# Patient Record
Sex: MALE | Race: WHITE | NOT HISPANIC OR LATINO
[De-identification: names, ages, dates, MRNs, and addresses within clinical notes are randomized per-mention and may not be internally consistent; named-entity substitution may affect disease eponyms.]

---

## 2020-07-27 ENCOUNTER — TRANSCRIPTION ENCOUNTER (OUTPATIENT)
Age: 50
End: 2020-07-27

## 2020-12-12 ENCOUNTER — TRANSCRIPTION ENCOUNTER (OUTPATIENT)
Age: 50
End: 2020-12-12

## 2020-12-18 ENCOUNTER — TRANSCRIPTION ENCOUNTER (OUTPATIENT)
Age: 50
End: 2020-12-18

## 2021-04-01 ENCOUNTER — TRANSCRIPTION ENCOUNTER (OUTPATIENT)
Age: 51
End: 2021-04-01

## 2021-04-05 ENCOUNTER — TRANSCRIPTION ENCOUNTER (OUTPATIENT)
Age: 51
End: 2021-04-05

## 2021-06-12 ENCOUNTER — EMERGENCY (EMERGENCY)
Facility: HOSPITAL | Age: 51
LOS: 1 days | Discharge: HOME | End: 2021-06-12
Attending: EMERGENCY MEDICINE | Admitting: EMERGENCY MEDICINE
Payer: COMMERCIAL

## 2021-06-12 ENCOUNTER — TRANSCRIPTION ENCOUNTER (OUTPATIENT)
Age: 51
End: 2021-06-12

## 2021-06-12 VITALS
RESPIRATION RATE: 18 BRPM | HEART RATE: 88 BPM | WEIGHT: 179.9 LBS | OXYGEN SATURATION: 97 % | TEMPERATURE: 98 F | DIASTOLIC BLOOD PRESSURE: 76 MMHG | HEIGHT: 68 IN | SYSTOLIC BLOOD PRESSURE: 126 MMHG

## 2021-06-12 PROCEDURE — 99284 EMERGENCY DEPT VISIT MOD MDM: CPT

## 2021-06-13 VITALS
OXYGEN SATURATION: 99 % | RESPIRATION RATE: 16 BRPM | TEMPERATURE: 99 F | SYSTOLIC BLOOD PRESSURE: 120 MMHG | HEART RATE: 88 BPM

## 2021-06-13 PROCEDURE — 99283 EMERGENCY DEPT VISIT LOW MDM: CPT

## 2021-06-13 RX ADMIN — Medication 100 MILLIGRAM(S): at 00:26

## 2021-06-13 NOTE — ED PROVIDER NOTE - CARE PROVIDER_API CALL
Mayur Madden  ORTHOPAEDIC SURGERY  [address] [facility], [address]  [place] [address]  Phone: [phone]  Fax: [phone]  Follow Up Time: 4-6 Days    Nancy Talbert)  Orthopaedic Surgery; Surgery of the Hand  [address] [place] [address]  Phone: [phone]  Fax: [phone]  Follow Up Time: 4-6 Days

## 2021-06-13 NOTE — POST DISCHARGE NOTE - DETAILS:
Feels the same, has not picked up antibiotic as yet. Discharge instructions reviewed and importance of antibiotic and follow up as recommended.

## 2021-06-13 NOTE — ED PROVIDER NOTE - PROVIDER TOKENS
PROVIDER:[TOKEN:[[id number]],FOLLOWUP:[4-6 Days]],PROVIDER:[TOKEN:[[id number]],FOLLOWUP:[4-6 Days]]

## 2021-06-13 NOTE — ED PROVIDER NOTE - PATIENT PORTAL LINK FT
You can access the FollowMyHealth Patient Portal offered by [facility] by registering at the following website: [email or web address]. By joining [facility]’s FollowMyHealth portal, you will also be able to view your health information using other applications (apps) compatible with our system.

## 2021-06-13 NOTE — ED PROVIDER NOTE - PROGRESS NOTE DETAILS
case d/w Dr. Chano cabrales - start doxy, f/u outpt with him or can send to hand case d/w Dr. Gali hong for doxy and sling, warm compresses, must return immediately for any worsening, can f/u outpt

## 2021-06-14 PROBLEM — Z00.00 ENCOUNTER FOR PREVENTIVE HEALTH EXAMINATION: Status: ACTIVE | Noted: 2021-06-14

## 2022-11-17 ENCOUNTER — NON-APPOINTMENT (OUTPATIENT)
Age: 52
End: 2022-11-17

## 2023-04-13 PROBLEM — M71.9 BURSOPATHY, UNSPECIFIED: Chronic | Status: ACTIVE | Noted: 2021-06-12

## 2023-04-19 ENCOUNTER — APPOINTMENT (OUTPATIENT)
Dept: PAIN MANAGEMENT | Facility: CLINIC | Age: 53
End: 2023-04-19
Payer: COMMERCIAL

## 2023-04-19 VITALS — WEIGHT: 177 LBS | BODY MASS INDEX: 26.83 KG/M2 | HEIGHT: 68 IN

## 2023-04-19 DIAGNOSIS — Z78.9 OTHER SPECIFIED HEALTH STATUS: ICD-10-CM

## 2023-04-19 PROCEDURE — 99204 OFFICE O/P NEW MOD 45 MIN: CPT

## 2023-04-19 NOTE — PHYSICAL EXAM
[de-identified] : Gen: NAD\par Gait: antalgic\par Psych:  Mood appropriate for given condition\par ROM: limited AROM of the L spine in all planes, full ROM at ankles, knees and hips  \par Sensation: decreased to lt touch over R leg, otherwise intact to light touch in all dermatomes tested from L2-S1 bilaterally\par Reflexes: 2+ b/l patella and Achilles\par Palpation: Diffusely tender over lumbar paraspinals  -TTP over the b/l greater trochanters and bilateral SI joint\par Provacative tests: +SLR, and seated root (slump test) on the R, neg Duggan, KELVIN testing, FADIR testing\par \par 				Right	Left\par L2/3	Hip flexion		 5	 5\par L3/4	Knee Extension		 5	 5\par L4/5	Ankle Dorsiflexion 	 5	 5\par L5/S1	Great toe extension	 5	 4\par L4/5	Inversion		 5	 5\par L5/S1	Eversion		 5	 4\par \par \par \par

## 2023-04-19 NOTE — DISCUSSION/SUMMARY
[de-identified] : \par After discussing various treatment options with the patient including but not limited to oral medications, physical therapy, exercise, modalities as well as interventional spinal injections, we have decided with the following plan: \par \par - pharmacological: c/w otc anti-inflammatories prn\par - Imaging: I personally reviewed the radiological images and agree with the radiologist's report. The radiological findings were discussed with the patient.\par - Interventional: schedule for L5-S1 ILESI. Procedure explained using an anatomical model. Risks and benefits explained to patient who verbalized understanding, including increased risk of infection, bleeding, nerve injury. \par - rehabilitative: c/w HEP/PT \par - follow up 2-3 weeks post-procedure\par \par Tejas Barney MD\par \par TFESI\par \par Indications for transforaminal epidural steroid injection for this specific patient include the following \par \par - Injections being provided as part of a comprehensive pain management program.  \par - Pt has failed 6 weeks of conservative therapy including but not limited to oral meds, PT, spinal manipulation, chiropractic care, cognitive behavioral therapy and or home exercise program which has been discussed with the patient \par - Injection being provided for suspected radicular pain.  \par - Pain scale of greater than or equal to 3/10 with functional impairment\par - No evidence of local or systemic infection, bleeding tendency or unstable medical condition.  \par - Pain is causing significant functional limitation resulting in diminished quality of life and impaired age appropriate ADL's. \par - epidural done for suspected radicular pain along dermatome of nerve \par - repeat injections are done no sooner than 7 days after the previous injection\par - repeat injections done only when pt reports 50% improvement in pain from previous injections with reduction in the use of pain medications and increase level of function/physical activity \par - Injection done at L5-S1 level(s) which is consistent with patient's dermatomal pain complaint\par \par - Radiculopathy is defined as pain, dysaethesias, or paraesthesias in a specified dermatomal distribution of an involved named spinal roots causing significant functional limitations (i.e., diminished quality of life and impaired, age-appropriate activities of daily living), and either of the following:\par 1) Pt has one or more of the following, concordant with nerve root compression of the involved named spinal roots demonstrated on a detailed neurologic examination within the prior three (3) months:\par - Loss of strength of specific named muscles or myotomal distributions\par - Altered sensation to light touch, pressure, pin prick or temperature in the sensory distribution\par - Diminished, absent or asymmetric reflexes\par \par 2) Documentation of either of the following performed within the prior 12 months:\par - A concordant radiologist’s interpretation of an advanced diagnostic imaging study (MRI or CT) of the spine demonstrating compression of the involved named spinal nerve roots; or\par         	- Electrodiagnostic studies (EMG/NCV’s) diagnostic of nerve root compression of the involved named spinal nerve roots.\par \par 	- Diagnostic\par 		- To establish the diagnosis of radiculopathy, when pain appears to be due to classic mono-radiculopathy but radiological or neurodiagnostic studies fail to provide a structural explanation; or\par     		-To establish the diagnosis of radiculopathy in a person with classic mono-radicular pain, in whom radiological studies demonstrate an abnormality related to an adjacent nerve root only; or\par     		- For those cases in which the clinical picture is suggestive but not typical for both nerve root and distal nerve or joint disease.\par \par 	- Therapeutic\par 		- treatment of patients with radiculopathy when non-invasive measures such as physical therapy and non-narcotic analgesics have failed or become intolerant, and the member has radicular pain that is consistent with radiologic findings. \par 		- A maximum of 3 (regardless of level, location, or side) per episode of pain, per region, is considered medically necessary every 6 months\par - A maximum of 4 will be done per region every 12 months\par \par

## 2023-04-19 NOTE — HISTORY OF PRESENT ILLNESS
[Lower back] : lower back [Right Leg] : right leg [4] : 4 [0] : 0 [Radiating] : radiating [Tingling] : tingling [Constant] : constant [Household chores] : household chores [Leisure] : leisure [Social interactions] : social interactions [Meds] : meds [FreeTextEntry1] : Initial HPI:04/19/2023\par Mr. YANES is a 52 year old M who presents for initial evaluation for low back and leg pain. Pain started 8 weeks ago and pain rated 7/10 or higher. It is not associated with any inciting injury. Pain radiates to down the R leg. Pain is described as shooting and electric shock when radiating.  Pain is worsened with sitting, coughing and bearing down. Patient has failed conservative treatment with acetaminophen, NSAIDs, muscle relaxants, and physical therapy/HEP. Pain is causing significant functional limitation resulting in diminished quality of life and impaired age appropriate ADL's. Patient denies loss of bowel/bladder function, new motor deficits, fevers, or chills. There is associated numbness/tingling.\par \par Images Reviewed: \par MRI L-spine 3-26-23 ([facility])\par  [] : no [FreeTextEntry6] : NUMBNESS

## 2023-05-17 ENCOUNTER — NON-APPOINTMENT (OUTPATIENT)
Age: 53
End: 2023-05-17

## 2023-05-22 ENCOUNTER — APPOINTMENT (OUTPATIENT)
Dept: PAIN MANAGEMENT | Facility: CLINIC | Age: 53
End: 2023-05-22

## 2023-06-07 ENCOUNTER — APPOINTMENT (OUTPATIENT)
Dept: PAIN MANAGEMENT | Facility: CLINIC | Age: 53
End: 2023-06-07

## 2023-09-18 ENCOUNTER — APPOINTMENT (OUTPATIENT)
Dept: PAIN MANAGEMENT | Facility: CLINIC | Age: 53
End: 2023-09-18
Payer: COMMERCIAL

## 2023-09-18 DIAGNOSIS — M54.17 RADICULOPATHY, LUMBOSACRAL REGION: ICD-10-CM

## 2023-09-18 PROCEDURE — 62323 NJX INTERLAMINAR LMBR/SAC: CPT

## 2023-10-11 ENCOUNTER — APPOINTMENT (OUTPATIENT)
Dept: PAIN MANAGEMENT | Facility: CLINIC | Age: 53
End: 2023-10-11
Payer: COMMERCIAL

## 2023-10-11 VITALS — BODY MASS INDEX: 25.61 KG/M2 | HEIGHT: 68 IN | WEIGHT: 169 LBS

## 2023-10-11 DIAGNOSIS — M47.816 SPONDYLOSIS W/OUT MYELOPATHY OR RADICULOPATHY, LUMBAR REGION: ICD-10-CM

## 2023-10-11 PROCEDURE — 99214 OFFICE O/P EST MOD 30 MIN: CPT

## 2023-12-11 ENCOUNTER — NON-APPOINTMENT (OUTPATIENT)
Age: 53
End: 2023-12-11